# Patient Record
Sex: FEMALE | Employment: PART TIME | URBAN - METROPOLITAN AREA
[De-identification: names, ages, dates, MRNs, and addresses within clinical notes are randomized per-mention and may not be internally consistent; named-entity substitution may affect disease eponyms.]

---

## 2018-06-20 ENCOUNTER — OFFICE VISIT (OUTPATIENT)
Dept: FAMILY MEDICINE CLINIC | Facility: CLINIC | Age: 48
End: 2018-06-20

## 2018-06-20 VITALS
SYSTOLIC BLOOD PRESSURE: 110 MMHG | DIASTOLIC BLOOD PRESSURE: 64 MMHG | HEART RATE: 92 BPM | WEIGHT: 152 LBS | TEMPERATURE: 99 F | BODY MASS INDEX: 28 KG/M2 | OXYGEN SATURATION: 98 %

## 2018-06-20 DIAGNOSIS — M54.50 ACUTE RIGHT-SIDED LOW BACK PAIN WITHOUT SCIATICA: Primary | ICD-10-CM

## 2018-06-20 PROCEDURE — 99202 OFFICE O/P NEW SF 15 MIN: CPT | Performed by: FAMILY MEDICINE

## 2018-06-20 RX ORDER — CYCLOBENZAPRINE HCL 10 MG
10 TABLET ORAL 3 TIMES DAILY
Qty: 15 TABLET | Refills: 0 | Status: SHIPPED | OUTPATIENT

## 2018-06-20 RX ORDER — NABUMETONE 500 MG/1
500 TABLET, FILM COATED ORAL 2 TIMES DAILY
Qty: 30 TABLET | Refills: 0 | Status: SHIPPED | OUTPATIENT
Start: 2018-06-20

## 2018-06-20 NOTE — PROGRESS NOTES
HPI:   Yari Toney is a 52year old female who is here for complaints of back pain. Pain is located at right hip/buttock. Pain is described as dull ache. Severity is 6/10. The pain does not radiate.  Pain was precipitated by 13 hr flight from [facility] 5 no dysuria, urgency or flank pain.   MUSCULOSKELETAL: no other joints are affected    EXAM:   /64   Pulse 92   Temp 99.1 °F (37.3 °C) (Oral)   Wt 152 lb   SpO2 98%   BMI 27.80 kg/m²    GENERAL: well developed, well nourished,in no apparent distress  S would impede her immune response at this time. If fever comes down and back pain is still lingering we will consider adding steroids in 2-3 days. Pt is encouraged to monitor for signs of shingles rash and f/u immediately if that occurs.        Patient Inst

## 2018-06-20 NOTE — PATIENT INSTRUCTIONS
Take nabumetone 500mg twice daily with food. Add flexeril (cyclobenzaprine) as needed for muscle tightness- every 8 hours. Continue heat, stretching, frequent movement. If no better in 2-3 days, follow-up for further evaluation.